# Patient Record
Sex: FEMALE | URBAN - NONMETROPOLITAN AREA
[De-identification: names, ages, dates, MRNs, and addresses within clinical notes are randomized per-mention and may not be internally consistent; named-entity substitution may affect disease eponyms.]

---

## 2017-07-27 ENCOUNTER — TELEPHONE (OUTPATIENT)
Dept: GASTROENTEROLOGY | Facility: CLINIC | Age: 52
End: 2017-07-27

## 2017-07-27 NOTE — TELEPHONE ENCOUNTER
PT HAS DR GEOVANNY BARRY LISTED AS THEIR PCP. DR BARRY HAS RETIRED SINCE WE HAVE LAST SEEN PT. PER DR BARRY'S OFFICE THE PATIENTS HAVE NOT BEEN ASSIGNED TO ANY PARTICULAR DR, ALSO NOT ALL OF HIS PATIENTS WILL REMAIN AT THEIR OFFICE.